# Patient Record
Sex: FEMALE | Race: BLACK OR AFRICAN AMERICAN
[De-identification: names, ages, dates, MRNs, and addresses within clinical notes are randomized per-mention and may not be internally consistent; named-entity substitution may affect disease eponyms.]

---

## 2020-08-24 ENCOUNTER — HOSPITAL ENCOUNTER (EMERGENCY)
Dept: HOSPITAL 92 - ERS | Age: 40
LOS: 1 days | Discharge: HOME | End: 2020-08-25
Payer: MEDICARE

## 2020-08-24 DIAGNOSIS — Z98.2: ICD-10-CM

## 2020-08-24 DIAGNOSIS — K21.9: Primary | ICD-10-CM

## 2020-08-24 DIAGNOSIS — D64.9: ICD-10-CM

## 2020-08-24 DIAGNOSIS — F41.9: ICD-10-CM

## 2020-08-24 DIAGNOSIS — Z79.899: ICD-10-CM

## 2020-08-24 DIAGNOSIS — F43.10: ICD-10-CM

## 2020-08-24 DIAGNOSIS — F32.9: ICD-10-CM

## 2020-08-24 DIAGNOSIS — I10: ICD-10-CM

## 2020-08-24 PROCEDURE — 82150 ASSAY OF AMYLASE: CPT

## 2020-08-24 PROCEDURE — 84484 ASSAY OF TROPONIN QUANT: CPT

## 2020-08-24 PROCEDURE — 83690 ASSAY OF LIPASE: CPT

## 2020-08-24 PROCEDURE — 85025 COMPLETE CBC W/AUTO DIFF WBC: CPT

## 2020-08-24 PROCEDURE — 80053 COMPREHEN METABOLIC PANEL: CPT

## 2020-08-24 PROCEDURE — 75809 NONVASCULAR SHUNT X-RAY: CPT

## 2020-08-24 PROCEDURE — 36415 COLL VENOUS BLD VENIPUNCTURE: CPT

## 2020-08-25 LAB
ALBUMIN SERPL BCG-MCNC: 4.2 G/DL (ref 3.5–5)
ALP SERPL-CCNC: 111 U/L (ref 40–110)
ALT SERPL W P-5'-P-CCNC: 29 U/L (ref 8–55)
AMYLASE SERPL-CCNC: 67 U/L (ref 25–125)
ANION GAP SERPL CALC-SCNC: 17 MMOL/L (ref 10–20)
AST SERPL-CCNC: 23 U/L (ref 5–34)
BILIRUB SERPL-MCNC: 0.2 MG/DL (ref 0.2–1.2)
BUN SERPL-MCNC: 13 MG/DL (ref 7–18.7)
CALCIUM SERPL-MCNC: 8.9 MG/DL (ref 7.8–10.44)
CHLORIDE SERPL-SCNC: 103 MMOL/L (ref 98–107)
CO2 SERPL-SCNC: 21 MMOL/L (ref 22–29)
CREAT CL PREDICTED SERPL C-G-VRATE: 0 ML/MIN (ref 70–130)
GLOBULIN SER CALC-MCNC: 3.7 G/DL (ref 2.4–3.5)
GLUCOSE SERPL-MCNC: 122 MG/DL (ref 70–105)
HGB BLD-MCNC: 11.5 G/DL (ref 12–16)
LIPASE SERPL-CCNC: 37 U/L (ref 8–78)
MCH RBC QN AUTO: 26.1 PG (ref 27–31)
MCV RBC AUTO: 79.8 FL (ref 78–98)
MDIFF COMPLETE?: YES
PLATELET # BLD AUTO: 229 THOU/UL (ref 130–400)
POTASSIUM SERPL-SCNC: 3.7 MMOL/L (ref 3.5–5.1)
RBC # BLD AUTO: 4.42 MILL/UL (ref 4.2–5.4)
SODIUM SERPL-SCNC: 137 MMOL/L (ref 136–145)
WBC # BLD AUTO: 9.2 THOU/UL (ref 4.8–10.8)

## 2020-08-25 NOTE — RAD
Exam: Lateral view skull, AP views skull, AP view neck, lateral view neck, AP chest and abdomen



COMPARISON: 7/31/2019



HISTORY: Evaluate for shunt malfunction. Headache.



FINDINGS: Stable retroperitoneal shunt catheter via a right frontal approach. No evidence of disconti
nuity. Catheter terminates in the right lower quadrant.



IMPRESSION: No evidence of a fragmented or disrupted shunt.



Reported By: Kathe Araya 

Electronically Signed:  8/25/2020 7:20 AM

## 2020-08-31 ENCOUNTER — HOSPITAL ENCOUNTER (EMERGENCY)
Dept: HOSPITAL 92 - ERS | Age: 40
Discharge: HOME | End: 2020-08-31
Payer: MEDICARE

## 2020-08-31 DIAGNOSIS — F43.10: ICD-10-CM

## 2020-08-31 DIAGNOSIS — Z79.899: ICD-10-CM

## 2020-08-31 DIAGNOSIS — K08.89: ICD-10-CM

## 2020-08-31 DIAGNOSIS — I10: ICD-10-CM

## 2020-08-31 DIAGNOSIS — F41.9: ICD-10-CM

## 2020-08-31 DIAGNOSIS — R20.2: Primary | ICD-10-CM

## 2020-08-31 DIAGNOSIS — D64.9: ICD-10-CM

## 2020-08-31 DIAGNOSIS — F32.9: ICD-10-CM

## 2020-08-31 DIAGNOSIS — Z98.2: ICD-10-CM

## 2020-08-31 DIAGNOSIS — R51: ICD-10-CM

## 2020-08-31 PROCEDURE — 70450 CT HEAD/BRAIN W/O DYE: CPT

## 2020-08-31 PROCEDURE — 75809 NONVASCULAR SHUNT X-RAY: CPT

## 2020-08-31 NOTE — CT
Exam: Head CT without contrast





HISTORY: Right facial numbness.



COMPARISON: 7/31/2019, 10/25/2017





FINDINGS:

Hemorrhage: No intraparenchymal hemorrhage or extra-axial hematoma.



Brain parenchyma: Cortical gray-white matter differentiation is preserved. No mass effect or midline 
shift. Basilar cisterns are patent.Findings compatible with a cisterna magna, unchanged. There is

diminutive left cerebellar hemisphere and cerebellar vermis with mild atrophy of the vermis. Findings
 are unchanged.

Ventricular system: Stable ventricle peritoneal shunt catheter via the right frontal lobe 4. The righ
t lateral ventricle is smaller compared to the previous examination. Correlate for possible over

shunting.

Calvarium: Intact.

Sinuses and mastoid air cells: Adequate aeration.



IMPRESSION:



1. Diminutive right lateral ventricle. Correlate for over shunting.

2. Stable changes in the posterior fossa.







Reported By: Kathe Araya 

Electronically Signed:  8/31/2020 9:58 PM

## 2020-08-31 NOTE — RAD
Exam: Shuntogram



HISTORY: Evaluate for shunt malfunction.



FINDINGS: AP view of the calvarium, AP and lateral view of the neck, AP view of the chest, AP view of
 the abdomen and AP view the pelvis is submitted for interpretation



COMPARISON: 8/25/2020



FINDINGS: Redemonstration of a right frontal ventriculoperitoneal shunt catheter. Shunt position appe
ars to be unchanged. The visualized shunt catheter appears to be intact as it courses the right

scalp, right neck, chest, abdomen and pelvis. Distal tip terminates in the midline of the pelvis.



IMPRESSION: Intact shunt catheter. No evidence of fragmentation or disruption.



Reported By: Kathe Araya 

Electronically Signed:  8/31/2020 11:48 PM

## 2020-09-23 ENCOUNTER — HOSPITAL ENCOUNTER (OUTPATIENT)
Dept: HOSPITAL 92 - BICULT | Age: 40
Discharge: HOME | End: 2020-09-23
Attending: INTERNAL MEDICINE
Payer: MEDICARE

## 2020-09-23 DIAGNOSIS — R10.13: Primary | ICD-10-CM

## 2020-09-23 PROCEDURE — 76705 ECHO EXAM OF ABDOMEN: CPT

## 2020-09-23 NOTE — ULT
ULTRASOUND ABDOMEN LIMITED:

(RIGHT UPPER QUADRANT)

 

HISTORY: 

A 40-year-old female with epigastric abdominal pain.

 

FINDINGS: 

The gallbladder has normal wall thickness and has no evidence of gallstones or sludge.  The hepatic e
chogenicity is normal.  The right kidney has normal echogenicity and has no hydronephrosis.  The panc
reas is visualized, although ultrasound is relatively insensitive for pancreatic pathology compared t
o CT and MRI.  There is no biliary dilation.  The common duct caliber is 4 mm.

 

IMPRESSION: 

Normal.

 

 

eze []

 

POS: [facility]

## 2020-10-13 ENCOUNTER — HOSPITAL ENCOUNTER (OUTPATIENT)
Dept: HOSPITAL 92 - TBSIIMAG | Age: 40
Discharge: HOME | End: 2020-10-13
Attending: NEUROLOGICAL SURGERY
Payer: MEDICARE

## 2020-10-13 DIAGNOSIS — G91.9: Primary | ICD-10-CM

## 2020-10-13 PROCEDURE — 70450 CT HEAD/BRAIN W/O DYE: CPT

## 2020-10-13 NOTE — CT
CT HEAD WITHOUT CONTRAST:

 

Date:  10/13/2020

 

INDICATION:

Hydrocephalus. 

 

Comparison made to recent CT brain of 08/31/2020. 

 

FINDINGS:

 shunt tube entering via the right frontal lobe is unchanged in position. It enters through the ant
erior horn on the right with tip at the midline. Diminutive right lateral ventricle is stable. Left l
ateral ventricle within normal size. Third and fourth ventricles appear unremarkable. 

 

CSF density in the posterior fossa on the left producing slight mass effect on the left cerebellum is
 again seen. Posterior arachnoid cyst is a consideration. This is a stable finding. 

 

No evidence of intracranial mass, hemorrhage, or infarct. Paranasal sinuses are clear. 

 

IMPRESSION: 

Stable head CT from prior exam. 

 

 

POS: [facility]

## 2020-12-09 ENCOUNTER — HOSPITAL ENCOUNTER (EMERGENCY)
Dept: HOSPITAL 92 - ERS | Age: 40
LOS: 1 days | Discharge: PSYCHIATRIC OR SUBSTANCE USE FACILITY | End: 2020-12-10
Payer: MEDICARE

## 2020-12-09 DIAGNOSIS — Z79.899: ICD-10-CM

## 2020-12-09 DIAGNOSIS — D64.9: ICD-10-CM

## 2020-12-09 DIAGNOSIS — F32.9: Primary | ICD-10-CM

## 2020-12-09 DIAGNOSIS — I10: ICD-10-CM

## 2020-12-09 DIAGNOSIS — Z79.1: ICD-10-CM

## 2020-12-09 LAB
ALBUMIN SERPL BCG-MCNC: 4.5 G/DL (ref 3.5–5)
ALP SERPL-CCNC: 112 U/L (ref 40–110)
ALT SERPL W P-5'-P-CCNC: 32 U/L (ref 8–55)
ANION GAP SERPL CALC-SCNC: 16 MMOL/L (ref 10–20)
APAP SERPL-MCNC: (no result) MCG/ML (ref 10–30)
AST SERPL-CCNC: 22 U/L (ref 5–34)
BASOPHILS # BLD AUTO: 0.1 THOU/UL (ref 0–0.2)
BASOPHILS NFR BLD AUTO: 1 % (ref 0–1)
BILIRUB SERPL-MCNC: 0.2 MG/DL (ref 0.2–1.2)
BUN SERPL-MCNC: 15 MG/DL (ref 7–18.7)
CALCIUM SERPL-MCNC: 9.1 MG/DL (ref 7.8–10.44)
CHLORIDE SERPL-SCNC: 105 MMOL/L (ref 98–107)
CO2 SERPL-SCNC: 23 MMOL/L (ref 22–29)
CREAT CL PREDICTED SERPL C-G-VRATE: 0 ML/MIN (ref 70–130)
DRUG SCREEN CUTOFF: (no result)
EOSINOPHIL # BLD AUTO: 0.2 THOU/UL (ref 0–0.7)
EOSINOPHIL NFR BLD AUTO: 1.7 % (ref 0–10)
GLOBULIN SER CALC-MCNC: 3.8 G/DL (ref 2.4–3.5)
GLUCOSE SERPL-MCNC: 93 MG/DL (ref 70–105)
HGB BLD-MCNC: 11.6 G/DL (ref 12–16)
LEUKOCYTE ESTERASE UR QL STRIP.AUTO: 25 LEU/UL
LYMPHOCYTES # BLD: 4.7 THOU/UL (ref 1.2–3.4)
LYMPHOCYTES NFR BLD AUTO: 44.4 % (ref 21–51)
MCH RBC QN AUTO: 26.7 PG (ref 27–31)
MCV RBC AUTO: 79.5 FL (ref 78–98)
MEDTOX CONTROL LINE VALID?: (no result)
MEDTOX READER #: (no result)
MONOCYTES # BLD AUTO: 0.4 THOU/UL (ref 0.11–0.59)
MONOCYTES NFR BLD AUTO: 3.6 % (ref 0–10)
NEUTROPHILS # BLD AUTO: 5.2 THOU/UL (ref 1.4–6.5)
NEUTROPHILS NFR BLD AUTO: 49.3 % (ref 42–75)
PLATELET # BLD AUTO: 245 THOU/UL (ref 130–400)
POTASSIUM SERPL-SCNC: 3.9 MMOL/L (ref 3.5–5.1)
PREGS CONTROL BACKGROUND?: (no result)
PREGS CONTROL BAR APPEAR?: YES
PROT UR STRIP.AUTO-MCNC: 20 MG/DL
RBC # BLD AUTO: 4.32 MILL/UL (ref 4.2–5.4)
RBC UR QL AUTO: (no result) HPF (ref 0–3)
SALICYLATES SERPL-MCNC: (no result) MG/DL (ref 15–30)
SODIUM SERPL-SCNC: 140 MMOL/L (ref 136–145)
SP GR UR STRIP: 1.03 (ref 1–1.04)
WBC # BLD AUTO: 10.6 THOU/UL (ref 4.8–10.8)
WBC UR QL AUTO: (no result) HPF (ref 0–3)

## 2020-12-09 PROCEDURE — 81015 MICROSCOPIC EXAM OF URINE: CPT

## 2020-12-09 PROCEDURE — 81003 URINALYSIS AUTO W/O SCOPE: CPT

## 2020-12-09 PROCEDURE — 80306 DRUG TEST PRSMV INSTRMNT: CPT

## 2020-12-09 PROCEDURE — 80307 DRUG TEST PRSMV CHEM ANLYZR: CPT

## 2020-12-09 PROCEDURE — 84703 CHORIONIC GONADOTROPIN ASSAY: CPT

## 2020-12-09 PROCEDURE — 84443 ASSAY THYROID STIM HORMONE: CPT

## 2020-12-09 PROCEDURE — 85025 COMPLETE CBC W/AUTO DIFF WBC: CPT

## 2020-12-09 PROCEDURE — 80053 COMPREHEN METABOLIC PANEL: CPT

## 2020-12-09 PROCEDURE — 36415 COLL VENOUS BLD VENIPUNCTURE: CPT

## 2020-12-09 PROCEDURE — 93005 ELECTROCARDIOGRAM TRACING: CPT

## 2021-06-14 ENCOUNTER — HOSPITAL ENCOUNTER (EMERGENCY)
Dept: HOSPITAL 92 - ERS | Age: 41
Discharge: HOME | End: 2021-06-14
Payer: MEDICARE

## 2021-06-14 DIAGNOSIS — Z87.19: ICD-10-CM

## 2021-06-14 DIAGNOSIS — D64.9: ICD-10-CM

## 2021-06-14 DIAGNOSIS — I10: ICD-10-CM

## 2021-06-14 DIAGNOSIS — Z79.899: ICD-10-CM

## 2021-06-14 DIAGNOSIS — R51.9: Primary | ICD-10-CM

## 2021-06-14 PROCEDURE — 70450 CT HEAD/BRAIN W/O DYE: CPT

## 2021-06-14 PROCEDURE — 75809 NONVASCULAR SHUNT X-RAY: CPT
